# Patient Record
Sex: FEMALE | Race: BLACK OR AFRICAN AMERICAN | Employment: UNEMPLOYED | URBAN - METROPOLITAN AREA
[De-identification: names, ages, dates, MRNs, and addresses within clinical notes are randomized per-mention and may not be internally consistent; named-entity substitution may affect disease eponyms.]

---

## 2018-02-22 ENCOUNTER — OFFICE VISIT (OUTPATIENT)
Dept: FAMILY MEDICINE CLINIC | Age: 53
End: 2018-02-22

## 2018-02-22 VITALS
TEMPERATURE: 97.9 F | WEIGHT: 186.4 LBS | RESPIRATION RATE: 18 BRPM | BODY MASS INDEX: 26.69 KG/M2 | OXYGEN SATURATION: 99 % | HEART RATE: 84 BPM | HEIGHT: 70 IN | DIASTOLIC BLOOD PRESSURE: 78 MMHG | SYSTOLIC BLOOD PRESSURE: 114 MMHG

## 2018-02-22 DIAGNOSIS — J20.8 ACUTE BACTERIAL BRONCHITIS: Primary | ICD-10-CM

## 2018-02-22 DIAGNOSIS — N63.10 BILATERAL BREAST LUMP: ICD-10-CM

## 2018-02-22 DIAGNOSIS — D24.2 BREAST FIBROADENOMA, LEFT: ICD-10-CM

## 2018-02-22 DIAGNOSIS — N63.20 BILATERAL BREAST LUMP: ICD-10-CM

## 2018-02-22 DIAGNOSIS — B96.89 ACUTE BACTERIAL BRONCHITIS: Primary | ICD-10-CM

## 2018-02-22 RX ORDER — MOXIFLOXACIN HYDROCHLORIDE 400 MG/1
400 TABLET ORAL DAILY
Qty: 10 TAB | Refills: 0 | Status: SHIPPED | OUTPATIENT
Start: 2018-02-22

## 2018-02-22 RX ORDER — GUAIFENESIN 600 MG/1
600 TABLET, EXTENDED RELEASE ORAL 2 TIMES DAILY
Qty: 20 TAB | Refills: 0 | Status: SHIPPED | OUTPATIENT
Start: 2018-02-22

## 2018-02-22 NOTE — PROGRESS NOTES
Netta Sanchez is a 50 y.o. female (: 1965) presenting to address:    Chief Complaint   Patient presents with    Cough     x6-7 days, OTC cough medicine not helping       Vitals:    18 0805   Weight: 186 lb 6.4 oz (84.6 kg)   Height: 5' 10\" (1.778 m)   PainSc:   0 - No pain   LMP: 2018       Hearing/Vision:   No exam data present    Learning Assessment:     Learning Assessment 5/3/2016   PRIMARY LEARNER Patient   PRIMARY LANGUAGE ENGLISH   LEARNER PREFERENCE PRIMARY LISTENING     READING   ANSWERED BY PATIENT   RELATIONSHIP SELF     Depression Screening:     PHQ over the last two weeks 2018   PHQ Not Done Active Diagnosis of Depression or Bipolar Disorder   Little interest or pleasure in doing things Not at all   Feeling down, depressed or hopeless Not at all   Total Score PHQ 2 0     Fall Risk Assessment:     Fall Risk Assessment, last 12 mths 5/3/2016   Able to walk? Yes   Fall in past 12 months? No     Abuse Screening:     Abuse Screening Questionnaire 5/3/2016   Do you ever feel afraid of your partner? N   Are you in a relationship with someone who physically or mentally threatens you? N   Is it safe for you to go home? Y     Coordination of Care Questionaire:   1. Have you been to the ER, urgent care clinic since your last visit? Hospitalized since your last visit?no  2. Have you seen or consulted any other health care providers outside of the [address] [place] [address] since your last visit? Include any pap smears or colon screening. no    Advanced Directive:   1. Do you have an Advanced Directive? No  2. Would you like information on Advanced Directives?  no

## 2018-02-22 NOTE — PROGRESS NOTES
[employer] Complaint   Patient presents with    Cough     x6-7 days, OTC cough medicine not helping     Vitals:    02/22/18 0805   BP: 114/78   Pulse: 84   Resp: 18   Temp: 97.9 °F (36.6 °C)   TempSrc: Oral   SpO2: 99%   Weight: 186 lb 6.4 oz (84.6 kg)   Height: 5' 10\" (1.778 m)   PainSc:   0 - No pain   LMP: 02/14/2018         HPI: Patient here because she has been coughing for over 10 days started with sore throat fever nasal congestion and that has progressed to cough productive sputum, with occasional shortness of breath and wheezing, fever subsided sore throat subsided but the cough is persistent she is not asthmatic or does not was not diagnosed with COPD. . Currently does not have any fever no nausea no vomiting no abdominal pain no chest pain no palpitations. Upon reviewing her file she does have abnormal breast ultrasound with a benign lesion fibroadenoma in the left breast and was advised to follow-up 6 months ago that was in May 2016 patient has not followed up since then. She has not had her physical annual physical or Pap smear in about 2 years. I advised her to need to schedule a physical and Pap smear, and on physical visit we will discuss a colonoscopy screening colonoscopy and colon cancer screening. Past Medical History:   Diagnosis Date    Anxiety     Arthritis     Breast lump     Lt breast 12 and 3 Oclock    Depression     Fibroadenoma of left breast may 18, 2016 test    6 month f/u us/ mammogram advised    Fibrocystic breast     [place]    HPV (human papilloma virus) infection     Psoriasis     Short-term memory loss      History reviewed. No pertinent surgical history.   Social History   Substance Use Topics    Smoking status: Never Smoker    Smokeless tobacco: Never Used    Alcohol use 1.8 oz/week     3 Glasses of wine per week       Family History   Problem Relation Age of Onset    Cancer Mother pancreatic    Ovarian Cancer Mother 80       Review of Systems   Constitutional: Negative for chills, fever, malaise/fatigue and weight loss. HENT: Negative for congestion, ear discharge, ear pain, hearing loss, nosebleeds, sinus pain and sore throat. Eyes: Negative for blurred vision, double vision and discharge. Respiratory: Positive for cough, sputum production and wheezing. Cardiovascular: Negative for chest pain, palpitations, claudication and leg swelling. Gastrointestinal: Negative for abdominal pain, constipation, diarrhea, nausea and vomiting. Genitourinary: Negative for dysuria, frequency and urgency. Musculoskeletal: Negative for back pain, falls, joint pain, myalgias and neck pain. Skin: Negative for itching and rash. Neurological: Negative for dizziness, tingling, sensory change, speech change, focal weakness, weakness and headaches. Physical Exam   Constitutional: She is oriented to person, place, and time. She appears well-developed and well-nourished. No distress. HENT:   Head: Normocephalic and atraumatic. Mouth/Throat: Oropharynx is clear and moist. No oropharyngeal exudate. Eyes: Conjunctivae are normal. Pupils are equal, round, and reactive to light. Right eye exhibits no discharge. Left eye exhibits no discharge. No scleral icterus. Neck: Neck supple. Thyromegaly present. Enlarged thyroid both lobes and nontender   Cardiovascular: Normal rate, regular rhythm and normal heart sounds. No murmur heard. Pulmonary/Chest: Effort normal and breath sounds normal. No respiratory distress. She has no wheezes. She has no rales. She exhibits no tenderness. Abdominal: Soft. She exhibits no distension. There is no tenderness. There is no rebound. Musculoskeletal: Normal range of motion. She exhibits no edema, tenderness or deformity. Lymphadenopathy:     She has no cervical adenopathy. Neurological: She is alert and oriented to person, place, and time.  No cranial nerve deficit. Coordination normal.   Skin: Skin is warm and dry. No rash noted. She is not diaphoretic. No erythema. No pallor. Psychiatric: She has a normal mood and affect. Her behavior is normal. Judgment and thought content normal.      Breast examination    Bilateral breast examination there is no skin changes no nipple changes no nipple discharge    No axillary lymph nodes  Patient does have lump on the one on the right breast it is in the right upper quadrant going a little bit to the medial side is on the latter going to medial about 3 cm diameter is non tender, and she does have another lump on the left breast and upper outer quadrant also about 2-3 cm is not tender. Assessment and plan     1. Acute bacterial bronchitis    - guaiFENesin ER (MUCINEX) 600 mg ER tablet; Take 1 Tab by mouth two (2) times a day. Dispense: 20 Tab; Refill: 0  - moxifloxacin (AVELOX) 400 mg tablet; Take 1 Tab by mouth daily. Dispense: 10 Tab; Refill: 0    2. Breast fibroadenoma, left    - AYAH MAMMO BI DX INCL CAD; Future  - US BREASTS LIMITED=<3 QUAD (Bilateral); Future    3. Bilateral breast lump  She needs to follow-up on her breast lump that she had not followed up need to have a bilateral ultrasound and diagnostic mammogram.    Current Outpatient Prescriptions   Medication Sig Dispense Refill    guaiFENesin ER (MUCINEX) 600 mg ER tablet Take 1 Tab by mouth two (2) times a day. 20 Tab 0    moxifloxacin (AVELOX) 400 mg tablet Take 1 Tab by mouth daily. 10 Tab 0    multivitamin (ONE A DAY) tablet Take 1 Tab by mouth daily. There are no active problems to display for this patient.     Results for orders placed or performed in visit on 09/21/16   NUSWAB VAGINITIS PLUS   Result Value Ref Range    Atopobium vaginae Moderate - 1 Score    BVAB 2 Low - 0 Score    Megasphaera 1 Low - 0 Score    C. albicans, TESSA Positive (A) Negative    C. glabrata, TESSA Negative Negative    T. vaginalis, TESSA Negative Negative    C. trachomatis, TESSA Negative Negative    N. gonorrhoeae, TESSA Negative Negative   AMB POC URINE PREGNANCY TEST, VISUAL COLOR COMPARISON   Result Value Ref Range    VALID INTERNAL CONTROL POC Yes     HCG urine, Ql. (POC) Negative Negative     No visits with results within 3 Month(s) from this visit. Latest known visit with results is:    Office Visit on 09/21/2016   Component Date Value Ref Range Status    Atopobium vaginae 09/21/2016 Moderate - 1  Score Final    BVAB 2 09/21/2016 Low - 0  Score Final    Megasphaera 1 09/21/2016 Low - 0  Score Final    Comment: Calculate total score by adding the 3 individual bacterial  vaginosis (BV) marker scores together. Total score is  interpreted as follows: Total score 0-1: Indicates the absence of BV. Total score   2: Indeterminate for BV. Additional clinical                   data should be evaluated to establish a                   diagnosis. Total score 3-6: Indicates the presence of BV. This test was developed and its performance characteristics  determined by [facility].  It has not been cleared or approved  by the Food and Drug Administration. The FDA has determined  that such clearance or approval is not necessary.  C. albicans, TESSA 09/21/2016 Positive* Negative Final    C. glabrata, TESSA 09/21/2016 Negative  Negative Final    Comment: This test was developed and its performance characteristics determined  by [facility].  It has not been cleared or approved by the Food and Drug  Administration. The FDA has determined that such clearance or  approval is not necessary.       T. vaginalis, TESSA 09/21/2016 Negative  Negative Final    C. trachomatis, TESSA 09/21/2016 Negative  Negative Final    N. gonorrhoeae, TESSA 09/21/2016 Negative  Negative Final    VALID INTERNAL CONTROL POC 09/21/2016 Yes   Final    HCG urine, Ql. (POC) 09/21/2016 Negative  Negative Final          Follow-up Disposition:  Return in about 2 weeks (around 3/8/2018) for Physical and pap smear Terry Aguilar

## 2018-02-22 NOTE — MR AVS SNAPSHOT
[address] [address] 
[phone] Patient: KALYANI Matias MRN: [id number] MMO:8/7/1856 Visit Information Date & Time Provider Department Dept. Phone Encounter #  
 2/22/2018  8:00 AM Dejon Adame MD [facility] [phone] [id number] Follow-up Instructions Return in about 2 weeks (around 3/8/2018) for Physical and pap smear . Upcoming Health Maintenance Date Due DTaP/Tdap/Td series (1 - Tdap) 4/4/1988 PAP AKA CERVICAL CYTOLOGY 4/4/1988 FOBT Q 1 YEAR AGE 50-75 4/4/2017 BREAST CANCER SCRN MAMMOGRAM 5/18/2018 Allergies as of 2/22/2018  Review Complete On: 2/22/2018 By: Dejon Adame MD  
  
 Severity Noted Reaction Type Reactions Penicillins  05/03/2016    Hives Remeron [Mirtazapine]  05/03/2016    Swelling Current Immunizations  Never Reviewed No immunizations on file. Not reviewed this visit You Were Diagnosed With   
  
 Codes Comments Acute bacterial bronchitis    -  Primary ICD-10-CM: J20.8, B96.89 
ICD-9-CM: 466.0, 041.9 Breast fibroadenoma, left     ICD-10-CM: D24.2 ICD-9-CM: 059 Bilateral breast lump     ICD-10-CM: N63.10, N63.20 ICD-9-CM: 611.72 Vitals BP Pulse Temp Resp Height(growth percentile) Weight(growth percentile) 114/78 (BP 1 Location: Right arm, BP Patient Position: Sitting) 84 97.9 °F (36.6 °C) (Oral) 18 5' 10\" (1.778 m) 186 lb 6.4 oz (84.6 kg) LMP SpO2 BMI OB Status Smoking Status 02/14/2018 (Exact Date) 99% 26.75 kg/m2 Having regular periods Never Smoker Vitals History BMI and BSA Data Body Mass Index Body Surface Area  
 26.75 kg/m 2 2.04 m 2 Preferred Pharmacy Pharmacy Name Phone [facility] [address], [address] [phone] Your Updated Medication List  
  
   
 This list is accurate as of 2/22/18  8:38 AM.  Always use your most recent med list.  
  
  
  
  
 guaiFENesin  mg ER tablet Commonly known as:  Lamonte [employer] Lamonte Take 1 Tab by mouth two (2) times a day. moxifloxacin 400 mg tablet Commonly known as:  Andres Yadav Take 1 Tab by mouth daily. multivitamin tablet Commonly known as:  ONE A DAY Take 1 Tab by mouth daily. Prescriptions Sent to Pharmacy Refills  
 guaiFENesin ER (MUCINEX) 600 mg ER tablet 0 Sig: Take 1 Tab by mouth two (2) times a day. Class: Normal  
 Pharmacy: [facility], [address] Ph #: [phone] Route: Oral  
 moxifloxacin (AVELOX) 400 mg tablet 0 Sig: Take 1 Tab by mouth daily. Class: Normal  
 Pharmacy: [facility] [address], [address] Ph #: [phone] Route: Oral  
  
Follow-up Instructions Return in about 2 weeks (around 3/8/2018) for Physical and pap smear . To-Do List   
 02/22/2018 Imaging:  AYAH MAMMO BI DX INCL CAD   
  
 02/22/2018 Imaging:  US BREAST LT LIMITED=<3 QUAD Introducing [facility]! [facility] introduces [employer] patient portal. Now you can access parts of your medical record, email your doctor's office, and request medication refills online. 1. In your internet browser, go to [email or web address] 2. Click on the First Time User? Click Here link in the Sign In box. You will see the New Member Sign Up page. 3. Enter your [employer] Access Code exactly as it appears below. You will not need to use this code after youve completed the sign-up process. If you do not sign up before the expiration date, you must request a new code. · [employer] Access Code: [email or web address] Expires: 5/23/2018  8:29 AM 
 
4. Enter the last four digits of your Social Security Number (xxxx) and Date of Birth (mm/dd/yyyy) as indicated and click Submit.  You will be taken to the next sign-up page. 5. Create a 20lines ID. This will be your 20lines login ID and cannot be changed, so think of one that is secure and easy to remember. 6. Create a 20lines password. You can change your password at any time. 7. Enter your Password Reset Question and Answer. This can be used at a later time if you forget your password. 8. Enter your e-mail address. You will receive e-mail notification when new information is available in [address]. 9. Click Sign Up. You can now view and download portions of your medical record. 10. Click the Download Summary menu link to download a portable copy of your medical information. If you have questions, please visit the Frequently Asked Questions section of the 20lines website. Remember, 20lines is NOT to be used for urgent needs. For medical emergencies, dial 911. Now available from your iPhone and Android! Please provide this summary of care documentation to your next provider. Your primary care clinician is listed as Ryan Crump. If you have any questions after today's visit, please call [phone].

## 2018-03-08 ENCOUNTER — OFFICE VISIT (OUTPATIENT)
Dept: FAMILY MEDICINE CLINIC | Age: 53
End: 2018-03-08

## 2018-03-08 VITALS
BODY MASS INDEX: 25.05 KG/M2 | TEMPERATURE: 98 F | DIASTOLIC BLOOD PRESSURE: 78 MMHG | RESPIRATION RATE: 18 BRPM | SYSTOLIC BLOOD PRESSURE: 122 MMHG | HEART RATE: 82 BPM | HEIGHT: 70 IN | WEIGHT: 175 LBS | OXYGEN SATURATION: 98 %

## 2018-03-08 DIAGNOSIS — Z12.4 SCREENING FOR CERVICAL CANCER: ICD-10-CM

## 2018-03-08 DIAGNOSIS — Z12.11 SCREENING FOR COLON CANCER: ICD-10-CM

## 2018-03-08 DIAGNOSIS — Z00.00 ANNUAL PHYSICAL EXAM: Primary | ICD-10-CM

## 2018-03-08 DIAGNOSIS — F32.A MILD DEPRESSION: ICD-10-CM

## 2018-03-08 DIAGNOSIS — B37.31 VAGINAL CANDIDIASIS: ICD-10-CM

## 2018-03-08 DIAGNOSIS — E04.9 THYROID GOITER: ICD-10-CM

## 2018-03-08 DIAGNOSIS — N89.8 VAGINAL DISCHARGE: ICD-10-CM

## 2018-03-08 DIAGNOSIS — Z20.2 STD EXPOSURE: ICD-10-CM

## 2018-03-08 NOTE — PROGRESS NOTES
Ashia Arndt is a 50 y.o. female presents to office for well woman        Health Maintenance items with a due date reviewed with patient:  Health Maintenance Due   Topic Date Due    DTaP/Tdap/Td series (1 - Tdap) 04/04/1988    PAP AKA CERVICAL CYTOLOGY  04/04/1988    FOBT Q 1 YEAR AGE 50-75  04/04/2017    BREAST CANCER SCRN MAMMOGRAM  05/18/2018

## 2018-03-08 NOTE — PROGRESS NOTES
[employer] Complaint   Patient presents with    Well Woman     Vitals:    03/08/18 0810   BP: 122/78   Pulse: 82   Resp: 18   Temp: 98 °F (36.7 °C)   TempSrc: Oral   SpO2: 98%   Weight: 175 lb (79.4 kg)   Height: 5' 10\" (1.778 m)   PainSc:   0 - No pain   LMP: 02/14/2018         HPI: Patient is here for her annual physical examination and Pap smear, she was here last visit for cough and bronchitis which has been much improving. She had a history of mild depression she is not on any medication at this point and her mood has been stable and she is she is able to manage she works and she is in a relationship currently and she does not suffer from any constant depressed mood and no suicidal thoughts. Her lifestyle is slightly sedentary she does not exercise and encouraged her to start exercising. She is sexually active with her fiancé. She has never had a colonoscopy and after supination discussion she declined a colonoscopy at this point and we will go ahead with the stool test test was discussed with her and she verbalized understanding. Past Medical History:   Diagnosis Date    Anxiety     Arthritis     Breast lump     Lt breast 12 and 3 Oclock    Depression     Fibroadenoma of left breast may 18, 2016 test    6 month f/u us/ mammogram advised    Fibrocystic breast     [place]    HPV (human papilloma virus) infection     Psoriasis     Short-term memory loss      No past surgical history on file. Social History   Substance Use Topics    Smoking status: Never Smoker    Smokeless tobacco: Never Used    Alcohol use 1.8 oz/week     3 Glasses of wine per week       Family History   Problem Relation Age of Onset    Cancer Mother      pancreatic    Ovarian Cancer Mother 80       Review of Systems   Constitutional: Negative for chills, fever, malaise/fatigue and weight loss.    HENT: Negative for congestion, ear discharge, ear pain, hearing loss, nosebleeds, sinus pain, sore throat and tinnitus. Eyes: Negative for blurred vision, double vision and discharge. Respiratory: Positive for cough and sputum production. Negative for hemoptysis, shortness of breath and wheezing. Cardiovascular: Negative for chest pain, palpitations, claudication and leg swelling. Gastrointestinal: Negative for abdominal pain, blood in stool, constipation, diarrhea, melena, nausea and vomiting. Genitourinary: Negative for dysuria, frequency and urgency. Musculoskeletal: Negative for back pain, joint pain, myalgias and neck pain. Skin: Negative for itching and rash. Neurological: Negative for dizziness, tingling, sensory change, speech change, focal weakness, seizures, loss of consciousness, weakness and headaches. Psychiatric/Behavioral: Negative for depression, hallucinations, substance abuse and suicidal ideas. The patient is not nervous/anxious and does not have insomnia. Physical Exam   Constitutional: She is oriented to person, place, and time. She appears well-developed and well-nourished. No distress. HENT:   Head: Normocephalic and atraumatic. Mouth/Throat: Oropharynx is clear and moist. No oropharyngeal exudate. Eyes: Conjunctivae are normal. Pupils are equal, round, and reactive to light. Right eye exhibits no discharge. Left eye exhibits no discharge. No scleral icterus. Neck: Thyromegaly present. Bilateral thyroid glands are enlarged   Cardiovascular: Normal rate, regular rhythm and normal heart sounds. No murmur heard. Pulmonary/Chest: Effort normal and breath sounds normal. No respiratory distress. She has no wheezes. She has no rales. She exhibits no tenderness. Abdominal: Soft. She exhibits no distension. There is no tenderness. There is no rebound and no guarding. Genitourinary: Vagina normal and uterus normal. No vaginal discharge found. Musculoskeletal: Normal range of motion.  She exhibits no edema, tenderness or deformity. Lymphadenopathy:     She has no cervical adenopathy. Neurological: She is alert and oriented to person, place, and time. No cranial nerve deficit. Coordination normal.   Skin: Skin is warm and dry. No rash noted. She is not diaphoretic. No erythema. No pallor. Psychiatric: She has a normal mood and affect. Her behavior is normal. Judgment and thought content normal.      Pap smear  Patient was placed in the lithotomy position, external genitalia were inspected there is no skin changes no lesion and no discharge was noticed ,using a white small speculum vagina was examined there was no discharge no lesion. Pap smear was taken using silicone brush after the cervix was visualized cervix looks normal  No swab also was taken  Bimanual present examination is normal.          Assessment and plan     1. Screening for colon cancer  I had a discussion with her regarding colonoscopy, patient had declined colonoscopy at this point so we will do fecal examination for screening and he discussed a colonoscopy next year if the test is negative    - COLOGUARD TEST (FECAL DNA COLORECTAL CANCER SCREENING)    2. Screening for cervical cancer    - PAP IG, APTIMA HPV AND RFX 16/18,45 ([id number]); Future  - PAP IG, APTIMA HPV AND RFX 16/18,45 ([id number])    3. Vaginal discharge    - NUSWAB VAGINITIS PLUS; Future  - NUSWAB VAGINITIS PLUS    4. Mild depression ([address]  Patient suffers from mild depression does not have suicidal thoughts never had a suicide attempt and she has been off medication because of pain making her more drowsy and groggy    5. Annual physical exam    - LIPID PANEL; Future  - METABOLIC PANEL, COMPREHENSIVE; Future  - CBC WITH AUTOMATED DIFF; Future  - LIPID PANEL  - METABOLIC PANEL, COMPREHENSIVE  - CBC WITH AUTOMATED DIFF    6. Thyroid goiter  There is a goiter with bilateral lobes and swelling on examination will get an ultrasound and thyroid function test  - T4, FREE;  Future  - TSH 3RD GENERATION; Future  - US THYROID/PARATHYROID/SOFT TISS; Future  - T4, FREE  - TSH 3RD GENERATION  - US THYROID/PARATHYROID/SOFT TISS    7. STD exposure    - NUSWAB VAGINITIS PLUS; Future  - NUSWAB VAGINITIS PLUS    Current Outpatient Prescriptions   Medication Sig Dispense Refill    guaiFENesin ER (MUCINEX) 600 mg ER tablet Take 1 Tab by mouth two (2) times a day. 20 Tab 0    moxifloxacin (AVELOX) 400 mg tablet Take 1 Tab by mouth daily. 10 Tab 0    multivitamin (ONE A DAY) tablet Take 1 Tab by mouth daily. Patient Active Problem List    Diagnosis Date Noted    Mild depression ([facility] [address] 03/08/2018     Results for orders placed or performed in visit on 09/21/16   NUSWAB VAGINITIS PLUS   Result Value Ref Range    Atopobium vaginae Moderate - 1 Score    BVAB 2 Low - 0 Score    Megasphaera 1 Low - 0 Score    C. albicans, TESSA Positive (A) Negative    C. glabrata, TESSA Negative Negative    T. vaginalis, TESSA Negative Negative    C. trachomatis, TESSA Negative Negative    N. gonorrhoeae, TESSA Negative Negative   AMB POC URINE PREGNANCY TEST, VISUAL COLOR COMPARISON   Result Value Ref Range    VALID INTERNAL CONTROL POC Yes     HCG urine, Ql. (POC) Negative Negative     No visits with results within 3 Month(s) from this visit. Latest known visit with results is:    Office Visit on 09/21/2016   Component Date Value Ref Range Status    Atopobium vaginae 09/21/2016 Moderate - 1  Score Final    BVAB 2 09/21/2016 Low - 0  Score Final    Megasphaera 1 09/21/2016 Low - 0  Score Final    Comment: Calculate total score by adding the 3 individual bacterial  vaginosis (BV) marker scores together. Total score is  interpreted as follows: Total score 0-1: Indicates the absence of BV. Total score   2: Indeterminate for BV. Additional clinical                   data should be evaluated to establish a                   diagnosis. Total score 3-6: Indicates the presence of BV.   This test was developed and its performance characteristics  determined by [facility]  It has not been cleared or approved  by the Food and Drug Administration. The FDA has determined  that such clearance or approval is not necessary.  C. albicans, TESSA 09/21/2016 Positive* Negative Final    C. glabrata, TESSA 09/21/2016 Negative  Negative Final    Comment: This test was developed and its performance characteristics determined  by [facility].  It has not been cleared or approved by the Food and Drug  Administration. The FDA has determined that such clearance or  approval is not necessary.  T. vaginalis, TESSA 09/21/2016 Negative  Negative Final    C. trachomatis, TESSA 09/21/2016 Negative  Negative Final    N. gonorrhoeae, TESSA 09/21/2016 Negative  Negative Final    VALID INTERNAL CONTROL POC 09/21/2016 Yes   Final    HCG urine, Ql. (POC) 09/21/2016 Negative  Negative Final          Follow-up Disposition:  Return as per test results.

## 2018-03-08 NOTE — MR AVS SNAPSHOT
[address] [place] [address] 
 
 
 [address] [facility] [address] [place] [id number] 
[phone] Patient: KALYANI Matias MRN: [id number] Visit Information Date & Time Provider Department Dept. Phone Encounter #  
 3/8/2018  8:00 AM Dejon Adame MD [facility] [place] [phone] [id number] Follow-up Instructions Return as per test results. Upcoming Health Maintenance Date Due DTaP/Tdap/Td series (1 - Tdap) 4/4/1988 PAP AKA CERVICAL CYTOLOGY 4/4/1988 FOBT Q 1 YEAR AGE 50-75 4/4/2017 BREAST CANCER SCRN MAMMOGRAM 5/18/2018 Allergies as of 3/8/2018  Review Complete On: 3/8/2018 By: Dejon Adame MD  
  
 Severity Noted Reaction Type Reactions Penicillins  05/03/2016    Hives Remeron [Mirtazapine]  05/03/2016    Swelling Current Immunizations  Never Reviewed No immunizations on file. Not reviewed this visit You Were Diagnosed With   
  
 Codes Comments Annual physical exam    -  Primary ICD-10-CM: Z00.00 ICD-9-CM: V70.0 Screening for colon cancer     ICD-10-CM: Z12.11 ICD-9-CM: V76.51 Screening for cervical cancer     ICD-10-CM: Z12.4 ICD-9-CM: V76.2 Vaginal discharge     ICD-10-CM: N89.8 ICD-9-CM: 623.5 Mild depression (HCC)     ICD-10-CM: F32.0 ICD-9-CM: 037 Thyroid goiter     ICD-10-CM: E04.9 ICD-9-CM: 240.9 STD exposure     ICD-10-CM: Z20.2 ICD-9-CM: V01.6 Vitals BP Pulse Temp Resp Height(growth percentile) Weight(growth percentile) 122/78 82 98 °F (36.7 °C) (Oral) 18 5' 10\" (1.778 m) 175 lb (79.4 kg) LMP SpO2 BMI OB Status Smoking Status 02/14/2018 (Exact Date) 98% 25.11 kg/m2 Having regular periods Never Smoker Vitals History BMI and BSA Data Body Mass Index Body Surface Area  
 25.11 kg/m 2 1.98 m 2 Preferred Pharmacy Pharmacy Name Phone [address] [phone] Your Updated Medication List  
  
   
This list is accurate as of 3/8/18  8:55 AM.  Always use your most recent med list.  
  
  
  
  
 guaiFENesin  mg ER tablet Commonly known as:  Lamonte & Lamonte Take 1 Tab by mouth two (2) times a day. moxifloxacin 400 mg tablet Commonly known as:  Gustavo Groom Take 1 Tab by mouth daily. multivitamin tablet Commonly known as:  ONE A DAY Take 1 Tab by mouth daily. We Performed the Following CBC WITH AUTOMATED DIFF [09049 CPT(R)] COLOGUARD TEST (FECAL DNA COLORECTAL CANCER SCREENING) [73120 CPT(R)] LIPID PANEL [24806 CPT(R)] METABOLIC PANEL, COMPREHENSIVE [25162 CPT(R)] [address] [id number] Custom] PAP IG, APTIMA HPV AND RFX 16/18,45 ([id number]) [[id number] Custom] T4, FREE W0800286 CPT(R)] TSH 3RD GENERATION [95244 CPT(R)] Follow-up Instructions Return as per test results. To-Do List   
 03/08/2018 Lab:  CBC WITH AUTOMATED DIFF   
  
 03/08/2018 Lab:  LIPID PANEL   
  
 03/08/2018 Lab:  METABOLIC PANEL, COMPREHENSIVE   
  
 03/08/2018 Lab:  NUSWAB VAGINITIS PLUS   
  
 03/08/2018 Pathology:  PAP IG, APTIMA HPV AND RFX 16/18,45 ([id number]) 03/08/2018 Lab:  T4, FREE   
  
 03/08/2018 Lab:  TSH 3RD GENERATION   
  
 03/08/2018 Imaging:  US THYROID/PARATHYROID/SOFT TISS Introducing [facility]! James Estrellita introduces [employer] patient portal. Now you can access parts of your medical record, email your doctor's office, and request medication refills online. 1. In your internet browser, go to [email or web address] 2. Click on the First Time User? Click Here link in the Sign In box. You will see the New Member Sign Up page. 3. Enter your [employer] Access Code exactly as it appears below.  You will not need to use this code after youve completed the sign-up process. If you do not sign up before the expiration date, you must request a new code. · [employer] Access Code: [email or web address] Expires: 5/23/2018  8:29 AM 
 
4. Enter the last four digits of your Social Security Number (xxxx) and Date of Birth (mm/dd/yyyy) as indicated and click Submit. You will be taken to the next sign-up page. 5. Create a [employer] ID. This will be your [employer] login ID and cannot be changed, so think of one that is secure and easy to remember. 6. Create a [employer] password. You can change your password at any time. 7. Enter your Password Reset Question and Answer. This can be used at a later time if you forget your password. 8. Enter your e-mail address. You will receive e-mail notification when new information is available in [address]. 9. Click Sign Up. You can now view and download portions of your medical record. 10. Click the Download Summary menu link to download a portable copy of your medical information. If you have questions, please visit the Frequently Asked Questions section of the [employer] website. Remember, [employer] is NOT to be used for urgent needs. For medical emergencies, dial 911. Now available from your iPhone and Android! Please provide this summary of care documentation to your next provider. Your primary care clinician is listed as Raymundo Duque. If you have any questions after today's visit, please call [phone].

## 2018-03-15 LAB
A-G RATIO,AGRAT: 1.4 RATIO (ref 1.1–2.6)
ABSOLUTE LYMPHOCYTE COUNT, 10803: 2.1 K/UL (ref 1–4.8)
ALBUMIN SERPL-MCNC: 4.3 G/DL (ref 3.5–5)
ALP SERPL-CCNC: 66 U/L (ref 25–115)
ALT SERPL-CCNC: 5 U/L (ref 5–40)
ANION GAP SERPL CALC-SCNC: 15 MMOL/L
AST SERPL W P-5'-P-CCNC: 12 U/L (ref 10–37)
BACTERIAL VAGINOSIS, NAA: NEGATIVE
BASOPHILS # BLD: 0 K/UL (ref 0–0.2)
BASOPHILS NFR BLD: 0 % (ref 0–2)
BILIRUB SERPL-MCNC: 0.5 MG/DL (ref 0.2–1.2)
BUN SERPL-MCNC: 10 MG/DL (ref 6–22)
CALCIUM SERPL-MCNC: 9.3 MG/DL (ref 8.4–10.5)
CANDIDA ALBICANS, NAA, 180056: NEGATIVE
CANDIDA GLABRATA, NAA, 180057: NEGATIVE
CANDIDA KRUSEI, NAA, 180016: NEGATIVE
CHLAMYDIA TRACHOMATIS, NAA, 180097: NEGATIVE
CHLORIDE SERPL-SCNC: 100 MMOL/L (ref 98–110)
CHOLEST SERPL-MCNC: 171 MG/DL (ref 110–200)
CO2 SERPL-SCNC: 24 MMOL/L (ref 20–32)
CREAT SERPL-MCNC: 0.8 MG/DL (ref 0.5–1.2)
EOSINOPHIL # BLD: 0.1 K/UL (ref 0–0.5)
EOSINOPHIL NFR BLD: 1 % (ref 0–6)
ERYTHROCYTE [DISTWIDTH] IN BLOOD BY AUTOMATED COUNT: 13.9 % (ref 10–16)
GFRAA, 66117: >60
GFRNA, 66118: >60
GLOBULIN,GLOB: 3.1 G/DL (ref 2–4)
GLUCOSE SERPL-MCNC: 84 MG/DL (ref 70–99)
GRANULOCYTES,GRANS: 49 % (ref 40–75)
HCT VFR BLD AUTO: 39.3 % (ref 35.1–48)
HDLC SERPL-MCNC: 2.9 MG/DL (ref 0–5)
HDLC SERPL-MCNC: 60 MG/DL (ref 40–59)
HGB BLD-MCNC: 12.2 G/DL (ref 11.7–16)
LDLC SERPL CALC-MCNC: 100 MG/DL (ref 50–99)
LYMPHOCYTES, LYMLT: 43 % (ref 27–45)
MCH RBC QN AUTO: 28 PG (ref 26–34)
MCHC RBC AUTO-ENTMCNC: 31 G/DL (ref 32–36)
MCV RBC AUTO: 91 FL (ref 80–95)
MISCELLANEOUS TEST,99000: NORMAL
MONOCYTES # BLD: 0.3 K/UL (ref 0.1–0.9)
MONOCYTES NFR BLD: 6 % (ref 3–9)
NEISSERIA GONORRHOEAE, NAA, 180104: NEGATIVE
NEUTROPHILS # BLD AUTO: 2.5 K/UL (ref 1.8–7.7)
PAP IMAGE GUIDED, 8900296: NORMAL
PLATELET # BLD AUTO: 355 K/UL (ref 140–440)
PMV BLD AUTO: 9.9 FL (ref 6–10.8)
POTASSIUM SERPL-SCNC: 4.2 MMOL/L (ref 3.5–5.5)
PROT SERPL-MCNC: 7.4 G/DL (ref 6.4–8.3)
RBC # BLD AUTO: 4.33 M/UL (ref 3.8–5.2)
SENT TO, 434: NORMAL
SODIUM SERPL-SCNC: 139 MMOL/L (ref 133–145)
T4 FREE SERPL-MCNC: 1.4 NG/DL (ref 0.9–1.8)
TEST NAME, 435: NORMAL
TRICH VAG BY NAA, 180087: NEGATIVE
TRIGL SERPL-MCNC: 55 MG/DL (ref 40–149)
TSH SERPL DL<=0.005 MIU/L-ACNC: 0.58 MCU/ML (ref 0.27–4.2)
VLDLC SERPL CALC-MCNC: 11 MG/DL (ref 8–30)
WBC # BLD AUTO: 5 K/UL (ref 4–11)

## 2018-03-16 RX ORDER — ASPIRIN 325 MG
1 TABLET, DELAYED RELEASE (ENTERIC COATED) ORAL
Qty: 45 G | Refills: 0 | Status: SHIPPED | OUTPATIENT
Start: 2018-03-16 | End: 2018-03-19

## 2018-03-16 NOTE — PROGRESS NOTES
Results are normal except there is vaginal yeast infection a clotrimazole will be sent to he pharmacy

## 2018-03-20 ENCOUNTER — TELEPHONE (OUTPATIENT)
Dept: FAMILY MEDICINE CLINIC | Age: 53
End: 2018-03-20

## 2018-03-20 NOTE — LETTER
3/20/2018 3:45 PM 
 
MsErmias KALYANI Matias [facility] [address] [place] [phone] Dear Ms. Juan Perez, We have been trying to reach you by telephone regarding your results. Please call the office back at [phone] at your earliest convenience. Thank you. Sincerely, Taina Coffey MD

## 2018-03-29 NOTE — TELEPHONE ENCOUNTER
----- Message from Gary Barba MD sent at 3/16/2018 10:09 AM EDT -----  Results are normal except there is vaginal yeast infection a clotrimazole will be sent to he pharmacy
Pt cannot be reached by phone. Sent a letter.
Detail Level: Detailed
Include Location In Plan?: No

## 2018-06-07 ENCOUNTER — PATIENT OUTREACH (OUTPATIENT)
Dept: FAMILY MEDICINE CLINIC | Age: 53
End: 2018-06-07

## 2022-09-26 ENCOUNTER — TELEPHONE (OUTPATIENT)
Dept: FAMILY MEDICINE CLINIC | Age: 57
End: 2022-09-26

## 2022-09-26 NOTE — TELEPHONE ENCOUNTER
----- Message from Rosaline Hanna sent at 9/23/2022 11:54 AM EDT -----  Subject: Message to Provider    QUESTIONS  Information for Provider? Patient would like to reestablish with this pcp   Dr Karlene Manning . call this patient back if can schedule an appt.   ---------------------------------------------------------------------------  --------------  Mary Weiner INFO  [phone]; OK to leave message on voicemail  ---------------------------------------------------------------------------  --------------  SCRIPT ANSWERS  Relationship to Patient?  Self

## 2022-09-26 NOTE — TELEPHONE ENCOUNTER
Pt's number on filed is not in service; Dr. Jennifer Easton is not accepting new patients at this time.